# Patient Record
(demographics unavailable — no encounter records)

---

## 2024-10-08 NOTE — HISTORY OF PRESENT ILLNESS
[de-identified] : Ms. Velazquez is a 65 year old female with a PMHX of HTN, DM, HLD that presetns for initial consultation referred by PCP Dr. El for macrocytic anemia.   Since 2020 .9 -106.6  Since 2020 hgb down trending from 15 to 12.3   NAVEEN no monoclonal band   SPEP normal electrophoresis pattern

## 2024-10-08 NOTE — BEGINNING OF VISIT
[0] : 2) Feeling down, depressed, or hopeless: Not at all (0) [[id number]] : 0 [Pain Scale: ___] : On a scale of 1-10, today the patient's pain is a(n) [unfilled]. [Former] : Former [< 15 Years] : < 15 Years [Date Discussed (MM/DD/YY): ___] : Discussed: [unfilled] [Patient/Caregiver not ready to engage] : Patient/Caregiver not ready to engage

## 2024-10-08 NOTE — HISTORY OF PRESENT ILLNESS
[de-identified] : Ms. Velazquez is a 65 year old female with a PMHX of HTN, DM, HLD that presetns for initial consultation referred by PCP Dr. El for macrocytic anemia.   Since 2020 .9 -106.6  Since 2020 hgb down trending from 15 to 12.3   NAVEEN no monoclonal band   SPEP normal electrophoresis pattern

## 2024-10-08 NOTE — ASSESSMENT
[FreeTextEntry1] : # Macrocytosis reviewed the ddx of macrocytosis to include Vitamin B12 (cobalamin) deficiency, Folate deficiency, Drugs, Reticulocytosis, Action of erythropoietin - skip macrocytes, stress erythrocytosis, Aplastic anemia/Fanconi anemia, Pure red cell aplasia, Primary bone marrow disorders such as  Myelodysplastic syndromes, Liver disease, Hypothyroidism, Alcohol abuse, Multiple myeloma and other plasma cell disorders Will check cbc with diff, cmp,  b12, folate, retic, NAVEEN, immunoglobulins, Flow cytometry, PS, ESR/CRP, LEYLA, TSH Advised etoh cessation May need BM bx if unrevealing  RTC in ~ 2 weeks to review blood work

## 2024-10-08 NOTE — ASSESSMENT
[FreeTextEntry1] : # Macrocytosis reviewed the ddx of macrocytosis to include Vitamin B12 (cobalamin) deficiency, Folate deficiency, Drugs, Reticulocytosis, Action of erythropoietin - skip macrocytes, stress erythrocytosis, Aplastic anemia/Fanconi anemia, Pure red cell aplasia, Primary bone marrow disorders such as  Myelodysplastic syndromes, Liver disease, Hypothyroidism, Alcohol abuse, Multiple myeloma and other plasma cell disorders Will check cbc with diff, cmp,  b12, folate, retic, NAVEEN, immunoglobulins, Flow cytometry, PS, ESR/CRP, LEYLA, TSH Advised etoh cessation May need BM bx if unrevealing  RTC in ~ 2 weeks to review blood work unk

## 2024-10-08 NOTE — BEGINNING OF VISIT
[0] : 2) Feeling down, depressed, or hopeless: Not at all (0) [[id number]] : 0 [Pain Scale: ___] : On a scale of 1-10, today the patient's pain is a(n) [unfilled]. [Former] : Former [< 15 Years] : < 15 Years [Date Discussed (MM/DD/YY): ___] : Discussed: [unfilled] [Patient/Caregiver not ready to engage] : Patient/Caregiver not ready to engage

## 2024-10-22 NOTE — HISTORY OF PRESENT ILLNESS
[de-identified] : Ms. Velazquez is a 65 year old female with a PMHX of HTN, DM, HLD that presetns for initial consultation referred by PCP Dr. El for macrocytic anemia.   Since 2020 .9 -106.6  Since 2020 hgb down trending from 15 to 12.3   NAVEEN no monoclonal band   SPEP normal electrophoresis pattern    [de-identified] : Patient seen via telehealth feeling well. no complaints [facility] home consent provided by patient using doximity televisit transitioned to audio due to technical difficulties

## 2024-10-22 NOTE — REASON FOR VISIT
[Initial Consultation] : an initial consultation for [Follow-Up Visit] : a follow-up visit for [FreeTextEntry2] : anemia

## 2024-10-22 NOTE — ASSESSMENT
[FreeTextEntry1] : # Macrocytosis work up unrevealing flow normal NAVEEN no monoclonal band b12, folate wnl iron wnl ESR/CRP wnl Kappa light chain elevated 2.75 - monitor LEYLA wnl advised to stop etoh use and we will re-assess in 4-6 months and if macrocytosis persists or if worsening anemia will consider bm bx at that time   RTC in 4-6 months with cbc with diff, cmp, esr/crp, b12, folate, irons, immunoglobulins

## 2024-10-22 NOTE — HISTORY OF PRESENT ILLNESS
[de-identified] : Ms. Velazquez is a 65 year old female with a PMHX of HTN, DM, HLD that presetns for initial consultation referred by PCP Dr. El for macrocytic anemia.   Since 2020 .9 -106.6  Since 2020 hgb down trending from 15 to 12.3   NAVEEN no monoclonal band   SPEP normal electrophoresis pattern    [de-identified] : Patient seen via telehealth feeling well. no complaints location of provider [facility] location of patient: home consent provided by patient using doximity televisit transitioned to audio due to technical difficulties

## 2024-11-02 NOTE — ASSESSMENT
[FreeTextEntry1] : # Macrocytosis work up unrevealing flow normal NAVEEN no monoclonal band b12, folate wnl iron wnl ESR/CRP wnl Kappa light chain elevated 2.75 - monitor LEYLA wnl advised to stop etoh use and we will re-assess in 4-6 months and if macrocytosis persists or if worsening anemia will consider bm bx at that time   RTC in 4-6 months with cbc with diff, cmp, esr/crp, b12, folate, irons, immunoglobulins
WDL

## 2024-11-02 NOTE — HISTORY OF PRESENT ILLNESS
[de-identified] : Ms. Velazquez is a 65 year old female with a PMHX of HTN, DM, HLD that presetns for initial consultation referred by PCP Dr. El for macrocytic anemia.   Since 2020 .9 -106.6  Since 2020 hgb down trending from 15 to 12.3   NAVEEN no monoclonal band   SPEP normal electrophoresis pattern    [de-identified] : Patient seen via telehealth feeling well. no complaints [facility] location of patient: home consent provided by patient using doximity televisit transitioned to audio due to technical difficulties

## 2025-05-19 NOTE — HISTORY OF PRESENT ILLNESS
[FreeTextEntry1] : Pt has had trouble sleeping at night for around a month and a half. [de-identified] : Ms. LACI DE LA ROSA is a 66 year old female here today for 1-2 month hx of trouble staying asleep. Has a hx of anxiety and depression. Says she does worry about her son. Hs tried melatonin in the past. Has not helped. Watches TV until she gets sleepy and then goes to bed with her I-pad.

## 2025-05-19 NOTE — HEALTH RISK ASSESSMENT
[No falls in past year] : Patient reported no falls in the past year [0] : 2) Feeling down, depressed, or hopeless: Not at all (0) [Never] : Never [Yes] : Yes [2 - 3 times a week (3 pts)] : 2 - 3  times a week (3 points) [Audit-CScore] : 3

## 2025-06-20 NOTE — REVIEW OF SYSTEMS
[Back Pain] : back pain [Negative] : Psychiatric [Recent Change In Weight] : ~T no recent weight change [Vision Problems] : no vision problems

## 2025-06-20 NOTE — HEALTH RISK ASSESSMENT
[Good] : ~his/her~  mood as  good [Never (0 pts)] : Never (0 points) [No falls in past year] : Patient reported no falls in the past year [No] : In the past 12 months have you used drugs other than those required for medical reasons? No [0] : 2) Feeling down, depressed, or hopeless: Not at all (0) [PHQ-2 Negative - No further assessment needed] : PHQ-2 Negative - No further assessment needed [Former] : Former [> 15 Years] : > 15 Years [NO] : No [Patient reported mammogram was abnormal] : Patient reported mammogram was abnormal [Patient reported bone density results were abnormal] : Patient reported bone density results were abnormal [With Family] : lives with family [Retired] : retired [Feels Safe at Home] : Feels safe at home [Fully functional (bathing, dressing, toileting, transferring, walking, feeding)] : Fully functional (bathing, dressing, toileting, transferring, walking, feeding) [Fully functional (using the telephone, shopping, preparing meals, housekeeping, doing laundry, using] : Fully functional and needs no help or supervision to perform IADLs (using the telephone, shopping, preparing meals, housekeeping, doing laundry, using transportation, managing medications and managing finances) [Reports normal functional visual acuity (ie: able to read med bottle)] : Reports normal functional visual acuity [Patient declined PAP Smear] : Patient declined PAP Smear [Patient reported colonoscopy was normal] : Patient reported colonoscopy was normal [Yes] : Yes [2 - 4 times a month (2 pts)] : 2-4 times a month (2 points) [1 or 2 (0 pts)] : 1 or 2 (0 points) [No Retinopathy] : No retinopathy [Audit-CScore] : 2 [de-identified] : walking [de-identified] : healthy [[id number]] : 0 [EyeExamDate] : 03/25 [Reports changes in hearing] : Reports no changes in hearing [Reports changes in vision] : Reports no changes in vision [Reports changes in dental health] : Reports no changes in dental health [MammogramDate] : 05/22 [MammogramComments] : Birads 0- had US [BoneDensityComments] : Osteoporosis [BoneDensityDate] : 09/24 [ColonoscopyDate] : 01/20 [ColonoscopyComments] : Cologuard

## 2025-06-20 NOTE — HISTORY OF PRESENT ILLNESS
[FreeTextEntry1] : [facility] [de-identified] : Ms. LACI DE LA ROSA is a 66 year old female here today for  Wellness last eye exam was a few months ago TdaP: ? PCV: